# Patient Record
Sex: FEMALE | Race: AMERICAN INDIAN OR ALASKA NATIVE | NOT HISPANIC OR LATINO | Employment: UNEMPLOYED | URBAN - METROPOLITAN AREA
[De-identification: names, ages, dates, MRNs, and addresses within clinical notes are randomized per-mention and may not be internally consistent; named-entity substitution may affect disease eponyms.]

---

## 2023-06-16 ENCOUNTER — LAB REQUISITION (OUTPATIENT)
Dept: LAB | Facility: CLINIC | Age: 33
End: 2023-06-16

## 2023-06-16 DIAGNOSIS — L03.90 CELLULITIS, UNSPECIFIED: ICD-10-CM

## 2023-06-16 PROCEDURE — 87070 CULTURE OTHR SPECIMN AEROBIC: CPT | Performed by: FAMILY MEDICINE

## 2023-06-16 PROCEDURE — 87077 CULTURE AEROBIC IDENTIFY: CPT | Performed by: FAMILY MEDICINE

## 2023-06-19 LAB — BACTERIA ABSC ANAEROBE+AEROBE CULT: ABNORMAL

## 2024-09-25 ENCOUNTER — TELEPHONE (OUTPATIENT)
Dept: BEHAVIORAL HEALTH | Facility: CLINIC | Age: 34
End: 2024-09-25
Payer: COMMERCIAL

## 2024-09-25 ENCOUNTER — HOSPITAL ENCOUNTER (EMERGENCY)
Facility: CLINIC | Age: 34
Discharge: HOME | End: 2024-09-25
Attending: STUDENT IN AN ORGANIZED HEALTH CARE EDUCATION/TRAINING PROGRAM | Admitting: STUDENT IN AN ORGANIZED HEALTH CARE EDUCATION/TRAINING PROGRAM
Payer: COMMERCIAL

## 2024-09-25 VITALS
SYSTOLIC BLOOD PRESSURE: 125 MMHG | HEIGHT: 66 IN | DIASTOLIC BLOOD PRESSURE: 82 MMHG | WEIGHT: 150 LBS | HEART RATE: 72 BPM | TEMPERATURE: 98.2 F | BODY MASS INDEX: 24.11 KG/M2 | OXYGEN SATURATION: 100 % | RESPIRATION RATE: 16 BRPM

## 2024-09-25 DIAGNOSIS — F11.93 OPIATE WITHDRAWAL (H): ICD-10-CM

## 2024-09-25 DIAGNOSIS — F11.90 OPIATE USE: ICD-10-CM

## 2024-09-25 PROCEDURE — 99284 EMERGENCY DEPT VISIT MOD MDM: CPT | Performed by: STUDENT IN AN ORGANIZED HEALTH CARE EDUCATION/TRAINING PROGRAM

## 2024-09-25 PROCEDURE — 99284 EMERGENCY DEPT VISIT MOD MDM: CPT | Mod: 25 | Performed by: STUDENT IN AN ORGANIZED HEALTH CARE EDUCATION/TRAINING PROGRAM

## 2024-09-25 PROCEDURE — G2213 INITIAT MED ASSIST TX IN ER: HCPCS | Performed by: STUDENT IN AN ORGANIZED HEALTH CARE EDUCATION/TRAINING PROGRAM

## 2024-09-25 PROCEDURE — 250N000013 HC RX MED GY IP 250 OP 250 PS 637: Performed by: STUDENT IN AN ORGANIZED HEALTH CARE EDUCATION/TRAINING PROGRAM

## 2024-09-25 RX ORDER — GABAPENTIN 300 MG/1
300 CAPSULE ORAL 3 TIMES DAILY
Qty: 15 CAPSULE | Refills: 0 | Status: SHIPPED | OUTPATIENT
Start: 2024-09-25 | End: 2024-09-30

## 2024-09-25 RX ORDER — HYDROXYZINE HYDROCHLORIDE 25 MG/1
25 TABLET, FILM COATED ORAL EVERY 4 HOURS PRN
Qty: 20 TABLET | Refills: 0 | Status: SHIPPED | OUTPATIENT
Start: 2024-09-25

## 2024-09-25 RX ORDER — IBUPROFEN 600 MG/1
600 TABLET, FILM COATED ORAL EVERY 6 HOURS PRN
Qty: 20 TABLET | Refills: 0 | Status: SHIPPED | OUTPATIENT
Start: 2024-09-25

## 2024-09-25 RX ORDER — ONDANSETRON 4 MG/1
4 TABLET, ORALLY DISINTEGRATING ORAL EVERY 8 HOURS PRN
Qty: 20 TABLET | Refills: 0 | Status: SHIPPED | OUTPATIENT
Start: 2024-09-25

## 2024-09-25 RX ORDER — CLONIDINE HYDROCHLORIDE 0.1 MG/1
0.1 TABLET ORAL 3 TIMES DAILY PRN
Qty: 20 TABLET | Refills: 0 | Status: SHIPPED | OUTPATIENT
Start: 2024-09-25

## 2024-09-25 RX ADMIN — GABAPENTIN 400 MG: 300 CAPSULE ORAL at 12:14

## 2024-09-25 ASSESSMENT — COLUMBIA-SUICIDE SEVERITY RATING SCALE - C-SSRS
2. HAVE YOU ACTUALLY HAD ANY THOUGHTS OF KILLING YOURSELF IN THE PAST MONTH?: NO
1. IN THE PAST MONTH, HAVE YOU WISHED YOU WERE DEAD OR WISHED YOU COULD GO TO SLEEP AND NOT WAKE UP?: NO
6. HAVE YOU EVER DONE ANYTHING, STARTED TO DO ANYTHING, OR PREPARED TO DO ANYTHING TO END YOUR LIFE?: NO

## 2024-09-25 ASSESSMENT — ACTIVITIES OF DAILY LIVING (ADL)
ADLS_ACUITY_SCORE: 35

## 2024-09-25 ASSESSMENT — LIFESTYLE VARIABLES: TOTAL_SCORE: 2

## 2024-09-25 NOTE — DISCHARGE INSTRUCTIONS
Please note that you have a follow-up appointment at the recovery clinic on Monday, September 30 at 12:30 PM.    Brixadi Discharge Instructions  Today, you received an injection of Brixadi (buprenorphine) to treat your opioid addiction. This medication has been injected under your skin and will be released into your body slowly over the next week to help reduce cravings and prevent withdrawal. It will be important for you to share this information with other healthcare providers, so they know how best to care for you while you are being treated with buprenorphine extended-release (BUP-XR).     After the medication is injected, you may feel a small bump under the skin for several days to weeks, it will get smaller over time. Do NOT rub or massage the area; keep it clean and dry. You will want to watch the injection site for redness, swelling, worsening pain, and/or drainage. To help soothe any discomfort you may experiencing you can place an ice pack at the site for up to 15 minutes at a time.      Buprenorphine is a safe medication for the treatment of opioid use disorder   Common Side Effects Seek Medical Attention Avoid   Constipation  Dizziness  Drowsiness   Headache  Injection site itching  Injection site pain   Nausea  Vomiting  Severe allergic reaction (rash, itching, swelling, severe dizziness, trouble breathing)   Severe drowsiness or difficulty waking up   Confusion or severe mood changes  Difficulty breathing or shortness of breath  Infection at the injection site (redness, swelling, worsening pain, pus) Alcohol, may increase serious side effects  Opioids, you will not feel the same effect as usual  Sedatives, or medications not prescribed to you as these can interact with BUP-XR     What other medications will I be discharged with?  You may be discharged with a prescription for additional prescriptions including   Buprenorphine - to help treat withdrawal and craving (if they return)  Ondansetron (Zofran)  "- to help treat nausea & vomiting   Acetaminophen (Tylenol) - may help muscle aches & pains   Ibuprofen (Motrin) - may help muscle aches & pains   Hydroxyzine (Atarax) - may help lessen anxiety   Clonidine - to help treat anxiety & restlessness, lower blood pressure & heart rate  What should I watch out for?  Once you leave the Emergency Department (ED), you will want to watch out for worsening withdrawal symptoms.   Anxiety and/or extreme restlessness  Muscle aches and body pains  Intense cravings for opioids  Rapid or racing heart rate  High blood pressure  Sweating   Shaking or tremors  Nausea, vomiting, diarrhea   What if the withdrawal comes back?  If any of the above withdrawal symptoms return, take buprenorphine   Dose one or two 8mg tablets or strips UNDER your tongue (total dose 8-16mg)  Wait one hour   If you feel the same or worse repeat dose (8mg - 16mg) - give yourself time and allow the medication to work  The next day, take half of the total dose in the morning and at night   If you took a total of 16mg, then take 8mg in the morning and 8mg at night  If you took a total of 32mg, then take 16mg in the morning and 16mg at night   Return to the Emergency Room if your symptoms do not improve     If you have a light habit: (For example, 5 \"Norco 10's a day)  Consider a low dose: start with 4mg and stop at 8mg total   WARNING: Withdrawal will continue if you don't take enough bup.     If you have a heavy habit: (For example, injecting 2g heroin a day or smoking 1g Fentanyl a day)  Consider a high dose: start with a first dose of 16mg  For most people, the effects of bup max out at around 24-32mg  WARNING: Too much bup can make you feel sick and sleepy    What if the withdrawal is getting worse, not better?   If you believe the withdrawal is getting worse, return to the Emergency Department        Follow - up Care  How do I continue the medication?   It is extremely important that you call [phone] to " schedule a follow-up appointment with one of our addiction medicine providers to continue your treatment plan for opioid addiction.   If you do not receive a call within two (2) business days, you will want to contact   [facility]   [facility]   [address] [place] [address] [address]  [place] [address] [facility]   [address] 3rd [place] [address]     What other clinics can I go to for Buprenorphine?   The following clinics offer Medication for Addiction Treatment (MAT) and provide walk-in hours  [facility]  [address] [facility] [address] (Entrance next to Emergency Department)   Walk-in Hours: Monday to Friday 9am to 11:30 and 12:30 to 3pm   Phone: [phone]    [facility]  [address] [place] [address]  [phone] *Press 1  [phone] (On-Call Access After Hours)  Peer Recovery Support: [phone]  Hours: Monday - Friday 8:30am to 5pm (Tues open at 9:30am; Lobby is closed 12:45 to 1:45 daily)    [facility] ([facility])  [address] [place] [address]  [phone] - Main Desk  [phone] - MAT / RANDEE   [phone] (Nurse Line- Night & Weekends)  Hours: Monday - Friday 7:15am to 4:45pm and 2nd Saturday of Month 8:15am to 12:30pm     [facility] ([facility])  [facility]   [address] [place] [address]   [phone]   Hours: Monday - Friday 8am to 5pm  [facility]    [address] [place] [address]   [phone]   Hours: Monday - Friday 8am to 5pm  [facility]   [address] [place] [address]  [phone]   Hours: Monday - Friday 8am to 5pm    [facility]   [address] [facility], 4th [place] [address]  [phone]  Walk-in Hours: Mon - Fri 10am to 4pm    In case of an emergency, call 911 or go to the nearest emergency room.  **  If you begin to experience worsening withdrawal symptoms return to the ED. **    Crisis Hotlines and Text Lines   Suicide and Crisis Lifeline 9-8-8   Crisis Text Line Text  [place]  to 851199   [facility] Mental Health Crisis Line  Dial **691482 from a mobile device   [facility] Dial [phone]   [facility] (LGBTQ+) Dial [phone]   [facility] Dial [phone]   [facility] Dial [phone]   Never Use Alone - Overdose Prevention Call [phone]       Remember: Follow your treatment plan and attend all follow-up appointments. If you have any questions or concerns, do not hesitate to reach out to your healthcare provider. Your health and recovery are important.

## 2024-09-25 NOTE — ED TRIAGE NOTES
Patient presents to receive and injection to help her stop using fentanyl. Patient is unable to confirm name of injection; however was referred here by Everton who works for [employer]. Patient last used fentanyl last night at 2300.     Triage Assessment (Adult)       Row Name 09/25/24 1047          Triage Assessment    Airway WDL WDL        Respiratory WDL    Respiratory WDL WDL        Skin Circulation/Temperature WDL    Skin Circulation/Temperature WDL WDL        Cardiac WDL    Cardiac WDL WDL        Peripheral/Neurovascular WDL    Peripheral Neurovascular WDL WDL        Cognitive/Neuro/Behavioral WDL    Cognitive/Neuro/Behavioral WDL WDL

## 2024-09-25 NOTE — ED PROVIDER NOTES
[facility] EMERGENCY DEPARTMENT ([facility])    9/25/24      ED PROVIDER NOTE     [address]  History     Chief Complaint   Patient presents with    Addiction Problem     Patient presents to receive and injection to help her stop using fentanyl. Patient is unable to confirm name of injection; however was referred here by Everton who works for [employer]. Patient last used fentanyl last night at 2300.     The history is provided by the patient and medical records.     Juan Alberto Rivera is a 33 year old female with history of generalized anxiety disorder, opiate use disorder and homelessness who presents to the emergency department seeking Brixadi initiation.  She has been using fentanyl, last used at 2300 last night.    Patient reports that she is starting to feel withdrawal.  Is feeling sweaty, nauseous, anxious, and irritable.  Normally uses fentanyl IV, and her last use was around 10:00 last night.  Denies any other concomitant drug use.  No other medical concerns at this time    Past Medical History  Past Medical History:   Diagnosis Date    Anemia      No past surgical history on file.  cloNIDine (CATAPRES) 0.1 MG tablet  hydrOXYzine HCl (ATARAX) 25 MG tablet  ibuprofen (ADVIL/MOTRIN) 600 MG tablet  naloxone (NARCAN) 4 MG/0.1ML nasal spray  ondansetron (ZOFRAN ODT) 4 MG ODT tab  Ferrous Sulfate (IRON SUPPLEMENT PO)  gabapentin (NEURONTIN) 300 MG capsule  ibuprofen (ADVIL,MOTRIN) 600 MG tablet  oxycodone-acetaminophen (PERCOCET) 5-325 MG per tablet      No Known Allergies  Family History  No family history on file.  Social History   Social History     Tobacco Use    Smoking status: Every Day     Current packs/day: 0.25     Types: Cigarettes   Substance Use Topics    Alcohol use: No    Drug use: Yes     Types: Marijuana      A medically appropriate review of systems was performed with pertinent positives and negatives noted in the HPI, and all other systems negative.    Physical Exam   BP: 117/66  Pulse: 79  Temp:  "98.1  F (36.7  C)  Resp: 16  Height: 166.4 cm (5' 5.5\")  Weight: 68 kg (150 lb)  SpO2: 100 %    Physical Exam  GEN: Visibly sweaty, uncomfortable appearing  HEENT: normocephalic and atraumatic, PERRLA, EOMI  CV: well-perfused, normal skin color for ethnicity, regular rate and rhythm  PULM: breathing comfortably, in no respiratory distress, clear to auscultation upper and lower lung fields  ABD: nondistended, soft, nontender  EXT: Full range of motion.  No edema.  NEURO: awake, conversant, grossly normal bilateral upper and lower extremity strength & ROM   SKIN: No rashes, ecchymosis, or lacerations  PSYCH: Calm and cooperative, interactive     ED Course, Procedures, & Data      Procedures       -----  Initiation of Medication for the Treatment of Opioid Use Disorder (OUD) in the Emergency Department (ED)  HCPCS code      Assessment:   Opioid(s) used: fentanyl   Amount: 0.5-1 g  Frequency: Daily  Route: intravenous  Duration: 1 year  Last use: 2300 last night    Other substance(s) with ongoing use: no    The patient meets the following DSM-V criteria for Opioid Use Disorder (OUD):   Taking more than was intended  Persistent desire or unsuccessful efforts to cut down  Time spent in activities necessary to obtain, use, and recover  Craving  Continued use despite causing social or interpersonal problems   Reduced social, occupational, or recreational activities  Tolerance  Withdrawal    (Severity: Mild: 2-3 criteria, Moderate: 4-5 criteria. Severe: 6 or more criteria)  Based on my assessment, the patient has Moderate OUD.     Medication Initiated in the ED:  In the ED, treatment for OUD was initiated. The patient was given 1 dose(s) of 24 mg subcutaneous buprenorphine while in the ED.     Upon ED discharge, outpatient prescription treatment for OUD was initiated.   The patient was prescribed naloxone and gabapentin .      Referral to Ongoing Care and Supportive Services:   Upon ED discharge, the patient was " referred to Addiction Medicine for ongoing care and supportive services. The patient was also provided with information on community resources for various supportive services for OUD, and advised to return to the ED if having worsening symptoms.   -----     No results found for any visits on 09/25/24.  Medications   buprenorphine ER (BRIXADI WEEKLY) 24 MG/0.48ML subcutaneous injection 24 mg (24 mg Subcutaneous $Given 9/25/24 1214)   gabapentin (NEURONTIN) capsule 400 mg (400 mg Oral $Given 9/25/24 1214)     Labs Ordered and Resulted from Time of ED Arrival to Time of ED Departure - No data to display  No orders to display          Critical care was not performed.     Medical Decision Making  The patient's presentation was of high complexity (a chronic illness severe exacerbation, progression, or side effect of treatment).    The patient's evaluation involved:  review of external note(s) from 3+ sources (see separate area of note for details)  review of 3+ test result(s) ordered prior to this encounter (see separate area of note for details)  ordering and/or review of 3+ test(s) in this encounter (see separate area of note for details)    The patient's management necessitated high risk (drug therapy requiring intensive monitoring (see separate area of note for details)).    Assessment & Plan    33-year-old female with a history of opiate use disorder presenting to the emergency department with opiate withdrawal requesting injected Brixadi noted to be in acute opiate withdrawal with her last use being yesterday evening here with a COWS score of 10.  Patient was given an IM dose of 25 mg subcu Brixadi.  Did well with this as well as other symptomatic treatment.  Will plan to discharge her with a short course of gabapentin, patient declined the Suboxone film.  Given information for follow-up and recovery clinic.    I did speak with Dr. Jose Borrego regarding her.  It seems as though she has an appointment scheduled with  recovery clinic on Monday, September 30 at 1230.  She has information that was given to her here about her appointment.  She has healthcare for the homeless involved who is coordinating the placement for her and she is supposed to get housing today.    I have reviewed the nursing notes. I have reviewed the findings, diagnosis, plan and need for follow up with the patient.    New Prescriptions    CLONIDINE (CATAPRES) 0.1 MG TABLET    Take 1 tablet (0.1 mg) by mouth 3 times daily as needed (anxiety, sweating, restlessness).    GABAPENTIN (NEURONTIN) 300 MG CAPSULE    Take 1 capsule (300 mg) by mouth 3 times daily for 5 days.    HYDROXYZINE HCL (ATARAX) 25 MG TABLET    Take 1 tablet (25 mg) by mouth every 4 hours as needed for anxiety.    IBUPROFEN (ADVIL/MOTRIN) 600 MG TABLET    Take 1 tablet (600 mg) by mouth every 6 hours as needed for moderate pain.    NALOXONE (NARCAN) 4 MG/0.1ML NASAL SPRAY    Spray 1 spray (4 mg) into one nostril alternating nostrils as needed for opioid reversal. every 2-3 minutes until assistance arrives    ONDANSETRON (ZOFRAN ODT) 4 MG ODT TAB    Take 1 tablet (4 mg) by mouth every 8 hours as needed for nausea or vomiting.       Final diagnoses:   Opiate use   Opiate withdrawal (H)       Tiny Stallworth MD  [facility] EMERGENCY DEPARTMENT  9/25/2024     Tiny Stallworth MD  09/25/24 8721

## 2024-09-25 NOTE — TELEPHONE ENCOUNTER
Patient currently in the ED to receive Brixadi. Per chart, patient is well established with [facility] and presented to the ED per their encouragement.     This writer met with patient briefly. Provided patient with copy of follow up appt at the Recovery [facility], Monday, 9/30/24. Encouraged patient to stop by the Recovery Clinic during walk-in hours, if needing to be seen sooner. Patient verbalized understanding.    Patient reports she does not have a phone, and declined to sign LIBBY for [facility].    [facility]  [facility] [address], [address]   [place], [place], [address]  Phone: [phone]  Fax: [phone]    Open Monday-Friday  Closed over lunch hour  Walk in hours: 9am-11:30am and 12:30-3pm    Lanie Harvey RN on 9/25/2024 at 12:16 PM

## 2024-09-25 NOTE — ED NOTES
Discharge instructions reviewed, all questions answered.  Patient declined prescription for Narcan, stating she has a lot.